# Patient Record
Sex: FEMALE | Race: BLACK OR AFRICAN AMERICAN | NOT HISPANIC OR LATINO | URBAN - METROPOLITAN AREA
[De-identification: names, ages, dates, MRNs, and addresses within clinical notes are randomized per-mention and may not be internally consistent; named-entity substitution may affect disease eponyms.]

---

## 2020-01-05 ENCOUNTER — EMERGENCY (EMERGENCY)
Facility: HOSPITAL | Age: 30
LOS: 0 days | Discharge: HOME | End: 2020-01-05
Attending: EMERGENCY MEDICINE
Payer: SELF-PAY

## 2020-01-05 VITALS
TEMPERATURE: 98 F | DIASTOLIC BLOOD PRESSURE: 61 MMHG | RESPIRATION RATE: 18 BRPM | HEART RATE: 52 BPM | OXYGEN SATURATION: 100 % | SYSTOLIC BLOOD PRESSURE: 108 MMHG

## 2020-01-05 VITALS — WEIGHT: 190.04 LBS | HEIGHT: 63 IN

## 2020-01-05 DIAGNOSIS — O20.9 HEMORRHAGE IN EARLY PREGNANCY, UNSPECIFIED: ICD-10-CM

## 2020-01-05 DIAGNOSIS — O03.4 INCOMPLETE SPONTANEOUS ABORTION WITHOUT COMPLICATION: ICD-10-CM

## 2020-01-05 LAB
ALBUMIN SERPL ELPH-MCNC: 3.3 G/DL — SIGNIFICANT CHANGE UP (ref 3.3–5)
ALP SERPL-CCNC: 58 U/L — SIGNIFICANT CHANGE UP (ref 40–120)
ALT FLD-CCNC: 25 U/L — SIGNIFICANT CHANGE UP (ref 12–78)
ANION GAP SERPL CALC-SCNC: 2 MMOL/L — LOW (ref 5–17)
APPEARANCE UR: ABNORMAL
APTT BLD: 32.9 SEC — SIGNIFICANT CHANGE UP (ref 27.5–36.3)
AST SERPL-CCNC: 13 U/L — LOW (ref 15–37)
BASOPHILS # BLD AUTO: 0.03 K/UL — SIGNIFICANT CHANGE UP (ref 0–0.2)
BASOPHILS NFR BLD AUTO: 0.4 % — SIGNIFICANT CHANGE UP (ref 0–2)
BILIRUB SERPL-MCNC: 0.3 MG/DL — SIGNIFICANT CHANGE UP (ref 0.2–1.2)
BILIRUB UR-MCNC: NEGATIVE — SIGNIFICANT CHANGE UP
BUN SERPL-MCNC: 8 MG/DL — SIGNIFICANT CHANGE UP (ref 7–23)
CALCIUM SERPL-MCNC: 8.6 MG/DL — SIGNIFICANT CHANGE UP (ref 8.5–10.1)
CHLORIDE SERPL-SCNC: 108 MMOL/L — SIGNIFICANT CHANGE UP (ref 96–108)
CO2 SERPL-SCNC: 26 MMOL/L — SIGNIFICANT CHANGE UP (ref 22–31)
COLOR SPEC: ABNORMAL
CREAT SERPL-MCNC: 0.72 MG/DL — SIGNIFICANT CHANGE UP (ref 0.5–1.3)
DIFF PNL FLD: ABNORMAL
EOSINOPHIL # BLD AUTO: 0.09 K/UL — SIGNIFICANT CHANGE UP (ref 0–0.5)
EOSINOPHIL NFR BLD AUTO: 1.2 % — SIGNIFICANT CHANGE UP (ref 0–6)
GLUCOSE SERPL-MCNC: 91 MG/DL — SIGNIFICANT CHANGE UP (ref 70–99)
GLUCOSE UR QL: NEGATIVE MG/DL — SIGNIFICANT CHANGE UP
HCG SERPL-ACNC: 1105 MIU/ML — HIGH
HCT VFR BLD CALC: 32.4 % — LOW (ref 34.5–45)
HCT VFR BLD CALC: 34.7 % — SIGNIFICANT CHANGE UP (ref 34.5–45)
HGB BLD-MCNC: 10.2 G/DL — LOW (ref 11.5–15.5)
HGB BLD-MCNC: 11.1 G/DL — LOW (ref 11.5–15.5)
IMM GRANULOCYTES NFR BLD AUTO: 0.3 % — SIGNIFICANT CHANGE UP (ref 0–1.5)
INR BLD: 1.29 RATIO — HIGH (ref 0.88–1.16)
KETONES UR-MCNC: ABNORMAL
LEUKOCYTE ESTERASE UR-ACNC: ABNORMAL
LYMPHOCYTES # BLD AUTO: 1.81 K/UL — SIGNIFICANT CHANGE UP (ref 1–3.3)
LYMPHOCYTES # BLD AUTO: 23.8 % — SIGNIFICANT CHANGE UP (ref 13–44)
MCHC RBC-ENTMCNC: 29.5 PG — SIGNIFICANT CHANGE UP (ref 27–34)
MCHC RBC-ENTMCNC: 32 GM/DL — SIGNIFICANT CHANGE UP (ref 32–36)
MCV RBC AUTO: 92.3 FL — SIGNIFICANT CHANGE UP (ref 80–100)
MONOCYTES # BLD AUTO: 0.48 K/UL — SIGNIFICANT CHANGE UP (ref 0–0.9)
MONOCYTES NFR BLD AUTO: 6.3 % — SIGNIFICANT CHANGE UP (ref 2–14)
NEUTROPHILS # BLD AUTO: 5.19 K/UL — SIGNIFICANT CHANGE UP (ref 1.8–7.4)
NEUTROPHILS NFR BLD AUTO: 68 % — SIGNIFICANT CHANGE UP (ref 43–77)
NITRITE UR-MCNC: NEGATIVE — SIGNIFICANT CHANGE UP
PH UR: 7 — SIGNIFICANT CHANGE UP (ref 5–8)
PLATELET # BLD AUTO: 256 K/UL — SIGNIFICANT CHANGE UP (ref 150–400)
POTASSIUM SERPL-MCNC: 3.8 MMOL/L — SIGNIFICANT CHANGE UP (ref 3.5–5.3)
POTASSIUM SERPL-SCNC: 3.8 MMOL/L — SIGNIFICANT CHANGE UP (ref 3.5–5.3)
PROT SERPL-MCNC: 7.5 GM/DL — SIGNIFICANT CHANGE UP (ref 6–8.3)
PROT UR-MCNC: 100 MG/DL
PROTHROM AB SERPL-ACNC: 14.4 SEC — HIGH (ref 10–12.9)
RBC # BLD: 3.76 M/UL — LOW (ref 3.8–5.2)
RBC # FLD: 12.6 % — SIGNIFICANT CHANGE UP (ref 10.3–14.5)
SODIUM SERPL-SCNC: 136 MMOL/L — SIGNIFICANT CHANGE UP (ref 135–145)
SP GR SPEC: 1.01 — SIGNIFICANT CHANGE UP (ref 1.01–1.02)
UROBILINOGEN FLD QL: NEGATIVE MG/DL — SIGNIFICANT CHANGE UP
WBC # BLD: 7.62 K/UL — SIGNIFICANT CHANGE UP (ref 3.8–10.5)
WBC # FLD AUTO: 7.62 K/UL — SIGNIFICANT CHANGE UP (ref 3.8–10.5)

## 2020-01-05 PROCEDURE — 84702 CHORIONIC GONADOTROPIN TEST: CPT

## 2020-01-05 PROCEDURE — 99284 EMERGENCY DEPT VISIT MOD MDM: CPT | Mod: 25

## 2020-01-05 PROCEDURE — 85018 HEMOGLOBIN: CPT

## 2020-01-05 PROCEDURE — 85014 HEMATOCRIT: CPT

## 2020-01-05 PROCEDURE — 76856 US EXAM PELVIC COMPLETE: CPT | Mod: 26

## 2020-01-05 PROCEDURE — 86900 BLOOD TYPING SEROLOGIC ABO: CPT

## 2020-01-05 PROCEDURE — 96374 THER/PROPH/DIAG INJ IV PUSH: CPT

## 2020-01-05 PROCEDURE — 80053 COMPREHEN METABOLIC PANEL: CPT

## 2020-01-05 PROCEDURE — 76856 US EXAM PELVIC COMPLETE: CPT

## 2020-01-05 PROCEDURE — 87086 URINE CULTURE/COLONY COUNT: CPT

## 2020-01-05 PROCEDURE — 85730 THROMBOPLASTIN TIME PARTIAL: CPT

## 2020-01-05 PROCEDURE — 96375 TX/PRO/DX INJ NEW DRUG ADDON: CPT

## 2020-01-05 PROCEDURE — 99284 EMERGENCY DEPT VISIT MOD MDM: CPT

## 2020-01-05 PROCEDURE — 96361 HYDRATE IV INFUSION ADD-ON: CPT

## 2020-01-05 PROCEDURE — 85610 PROTHROMBIN TIME: CPT

## 2020-01-05 PROCEDURE — 86850 RBC ANTIBODY SCREEN: CPT

## 2020-01-05 PROCEDURE — 86901 BLOOD TYPING SEROLOGIC RH(D): CPT

## 2020-01-05 PROCEDURE — 81001 URINALYSIS AUTO W/SCOPE: CPT

## 2020-01-05 PROCEDURE — 36415 COLL VENOUS BLD VENIPUNCTURE: CPT

## 2020-01-05 PROCEDURE — 85025 COMPLETE CBC W/AUTO DIFF WBC: CPT

## 2020-01-05 PROCEDURE — 99243 OFF/OP CNSLTJ NEW/EST LOW 30: CPT

## 2020-01-05 RX ORDER — ONDANSETRON 8 MG/1
4 TABLET, FILM COATED ORAL ONCE
Refills: 0 | Status: COMPLETED | OUTPATIENT
Start: 2020-01-05 | End: 2020-01-05

## 2020-01-05 RX ORDER — SODIUM CHLORIDE 9 MG/ML
1000 INJECTION INTRAMUSCULAR; INTRAVENOUS; SUBCUTANEOUS ONCE
Refills: 0 | Status: COMPLETED | OUTPATIENT
Start: 2020-01-05 | End: 2020-01-05

## 2020-01-05 RX ORDER — MORPHINE SULFATE 50 MG/1
4 CAPSULE, EXTENDED RELEASE ORAL ONCE
Refills: 0 | Status: DISCONTINUED | OUTPATIENT
Start: 2020-01-05 | End: 2020-01-05

## 2020-01-05 RX ADMIN — SODIUM CHLORIDE 1000 MILLILITER(S): 9 INJECTION INTRAMUSCULAR; INTRAVENOUS; SUBCUTANEOUS at 11:46

## 2020-01-05 RX ADMIN — MORPHINE SULFATE 4 MILLIGRAM(S): 50 CAPSULE, EXTENDED RELEASE ORAL at 10:39

## 2020-01-05 RX ADMIN — MORPHINE SULFATE 4 MILLIGRAM(S): 50 CAPSULE, EXTENDED RELEASE ORAL at 11:10

## 2020-01-05 RX ADMIN — SODIUM CHLORIDE 2000 MILLILITER(S): 9 INJECTION INTRAMUSCULAR; INTRAVENOUS; SUBCUTANEOUS at 10:39

## 2020-01-05 RX ADMIN — ONDANSETRON 4 MILLIGRAM(S): 8 TABLET, FILM COATED ORAL at 10:39

## 2020-01-05 NOTE — ED ADULT NURSE NOTE - NSIMPLEMENTINTERV_GEN_ALL_ED
Implemented All Universal Safety Interventions:  [place] to call system. Call bell, personal items and telephone within reach. Instruct patient to call for assistance. Room bathroom lighting operational. Non-slip footwear when patient is off stretcher. Physically safe environment: no spills, clutter or unnecessary equipment. Stretcher in lowest position, wheels locked, appropriate side rails in place.

## 2020-01-05 NOTE — ED PROVIDER NOTE - CARE PROVIDER_API CALL
Jennifer Langston)  Obstetrics and Gynecology  [address] [place] [address]  Phone: [phone]  Fax: [phone]  Follow Up Time:

## 2020-01-05 NOTE — CONSULT NOTE ADULT - SUBJECTIVE AND OBJECTIVE BOX
28 y/o  LMP 10/26/19 EGA 10+ weeks with c/o of vaginal bleeding x 24 hours; +clots; +cramps.  Pt wtih +UCG at home but has not seen her PMD (Y[facility]a) yet for prenatal care.    PMH - not sig  PSH - not sig  POB - C/S x 2  Meds - none    PE -   T(F): 98.6 (05 Jan 2020 09:28), Max: 98.6 (05 Jan 2020 09:28)  HR: 108 (05 Jan 2020 09:28) (108 - 108)  BP: 127/81 (05 Jan 2020 09:28) (127/81 - 127/81)    PE -   Ab - soft/nt/nd  Pelvic - Os open; minimal bleeding noted; about 10 cc of dark red blood clots in vagina.  Attempt made to remove any POCs from cervix but unsuccessful  Ext - No C/C/E, no calf pain    Sono:    EXAM:  US PELVIC COMPLETE                            PROCEDURE DATE:  01/05/2020          INTERPRETATION:  CLINICAL INFORMATION: 10 weeks pregnancy with heavy vaginal bleeding.    LMP: 10/26/2019    COMPARISON: None available.    TECHNIQUE:     Endovaginal and transabdominal pelvic sonogram. Color and Spectral Doppler was performed. Endovaginal scanning was performed for detailed visualization of the endometrium and the ovaries.    FINDINGS:    Uterus: 10.1 x 4.7 x 4.8 cm. No focal myometrial lesion.    Endometrium: 18 mm. No evidence of an intrauterine gestational sac. There is however evidence of a complex structure in the region of cervix. In view of clinical history this may represent gestational products and hence a miscarriage in progress. A follow-up can be obtained if needed.    Right ovary: 2.6 x 2.1 x 1.8 cm. Within normal limits. Normal arterial and venous waveforms.    Left ovary: 2.8 x 1.6 x 1.6 cm. Within normal limits. Normal arterial and venous waveforms.    Fluid: None.    IMPRESSION:    Mildly prominent endometrium without an intrauterine gestational sac or any evidence of flow within the endometrial cavity.    Complex cystic structure in the region of cervix. Consider possibility of a miscarriage in progress. A follow-up can be obtained.    URIEL BORREGO   This document has been electronically signed. Jan 5 2020 11:32AM                          11.1   7.62  )-----------( 256      ( 05 Jan 2020 10:07 )             34.7     RH B+    A/P Pt with most likely SAB in progress.  Pt in stable condition.    Discussed R/B/A of exp mangement vs, Misoprostol vs D and C    Pt wishes to have Misoprostol.    Rec:  1) 600 mcg of Misoprostol Buccal - hold in buccal area x 20 min  2) Repeat vitals  3) Consider CBC to assess stability of HCT  4) Pt to F/U with PMD  5) Come back if any increased pain, bleeding, temps, N/V, LOC    N Gabbur

## 2020-01-05 NOTE — ED PROVIDER NOTE - OBJECTIVE STATEMENT
30 y/o P:2 10 week pregnant female with no PMHx, PSHx of s/p  x2 presents to the ED c/o vaginal bleeding since this AM. Since this AM pt has had vaginal bleeding, is passing fresh clots. Describes bleeding as "a lot," heavier than normal menses. Associated with abd cramps. Took Tylenol for symptom relief PTA. Has not had evaluation with OB/GYN for current pregnancy. No recent trauma, falls, injury. Former smoker. Occasional EtOH. OB/GYN: James ([place])

## 2020-01-05 NOTE — ED ADULT NURSE NOTE - OBJECTIVE STATEMENT
Pt ambulatory to triage, A&O x3, c/o vaginal bleeding since yesterday.  Pt reports spotting started yesterday around 1pm, bleeding has gradually increased since last night, pt reports bleeding became heavy this morning saturating about one pad every 2 hours.  Pt reports being 10 weeks pregnant, took home pregnancy test, has not seen OB/GYN yet.  Pt has 4 living children, no hx of previous miscarriage.  Pt c/o lower abdominal pain and cramping, rating 10/10, and mild nausea.  No PMH.

## 2020-01-05 NOTE — ED PROVIDER NOTE - PATIENT PORTAL LINK FT
You can access the FollowMyHealth Patient Portal offered by [facility] by registering at the following website: [email or web address]. By joining [employer]’s FollowMyHealth portal, you will also be able to view your health information using other applications (apps) compatible with our system.

## 2020-01-05 NOTE — ED ADULT TRIAGE NOTE - CHIEF COMPLAINT QUOTE
Pt reports being ten weeks pregnant and experiencing significant vaginal bleeding with abd cramping. .

## 2020-01-05 NOTE — ED PROVIDER NOTE - NSFOLLOWUPINSTRUCTIONS_ED_ALL_ED_FT
Please follow up with your own gynecologist, if you do not have one then return to us or contact the number provided for you here.     Incomplete Miscarriage  A miscarriage is the loss of an unborn baby (fetus) before the 20th week of pregnancy. In an incomplete miscarriage, parts of the fetus or placenta (afterbirth) remain in the body. Most miscarriages happen in the first 3 months of pregnancy. Sometimes, it happens before a woman even knows she is pregnant.  Having a miscarriage can be an emotional experience. If you have had a miscarriage, talk with your health care provider about any questions you may have about miscarrying, the grieving process, and your future pregnancy plans.  What are the causes?  This condition may be caused by:  Problems with the genes or chromosomes that make it impossible for the baby to develop normally. These problems are most often the result of random errors that occur early in development, and are not passed from parent to child (not inherited).Infection of the cervix or uterus.Conditions that affect hormone balance in the body.Problems with the cervix, such as the cervix opening and thinning before pregnancy is at term (cervical insufficiency).Problems with the uterus, such as a uterus with an abnormal shape, fibroids in the uterus, or problems that were present from birth (congenital abnormalities).Certain medical conditions.Smoking, drinking alcohol, or using drugs.Injury (trauma).Many times, the cause of a miscarriage is not known.  What are the signs or symptoms?  Symptoms of this condition include:  Vaginal bleeding or spotting, with or without cramps or pain.Pain or cramping in the abdomen or lower back.Passing fluid, tissue, or blood clots from the vagina.How is this diagnosed?  This condition may be diagnosed based on:  A physical exam.Ultrasound.Blood tests.Urine tests.How is this treated?  An incomplete miscarriage may be treated with:  Dilation and curettage (D&C). This is a procedure in which the cervix is stretched open and the lining of the uterus (endometrium) is scraped to remove any remaining tissue from the pregnancy.Medicines, such as:  Antibiotic medicine to treat infection.Medicine to help any remaining tissue pass out of your uterus.Medicine to reduce (contract) the size of the uterus. These medicines may be given if you have a lot of bleeding.If you have Rh negative blood and your baby was Rh positive, you will need a shot of medicine called Rh immunoglobulinto protect future babies from Rh blood problems. "Rh-negative" and "Rh-positive" refer to whether or not the blood has a specific protein found on the surface of red blood cells (Rh factor).  Follow these instructions at home:  Medicines        Take over-the-counter and prescription medicines only as told by your health care provider.If you were prescribed antibiotic medicine, take your antibiotic as told by your health care provider. Do not stop taking the antibiotic even if you start to feel better.Do not take NSAIDs, such as aspirin and ibuprofen, unless approved by your doctor. These medicines can cause bleeding.Activity     Rest as directed. Ask your health care provider what activities are safe for you.Have someone help with home and family responsibilities during this time.General instructions     Keep track of the number of sanitary pads you use each day and how soaked (saturated) they are. Write down this information.Monitor the amount of tissue or blood clots that you pass from your vagina. Save any large amounts of tissue for your health care provider to examine.Do not use tampons, douche, or have sex until your health care provider approves.To help you and your partner with the process of grieving, talk with your health care provider or seek counseling to help cope with the pregnancy loss.When you are ready, meet with your health care provider to discuss important steps you should take for your health, as well as steps to take in order to have a healthy pregnancy in the future.Keep all follow-up visits as told by your health care provider. This is important.Where to find more information  The American Congress of Obstetricians and Gynecologists: www.acog.orgU.S. Department of Health and Human Services Office of Women’s Health: www.womenshealth.govContact a health care provider if:  You have a fever or chills.You have a foul smelling vaginal discharge.Get help right away if:  You have severe cramps or pain in your back or abdomen.You pass walnut-sized (or larger) blood clots or tissue from your vagina.You have heavy bleeding, soaking more than 1 regular sanitary pad in an hour.You become lightheaded or weak.You pass out.You have feelings of sadness that take over your thoughts, or you have thoughts of hurting yourself.Summary  In an incomplete miscarriage, parts of the fetus or placenta (afterbirth) remain in the body.There are multiple treatment options for an incomplete miscarriage, talk to your health care provider about the best option for you.Follow your health care provider's instructions for follow-up care.To help you and your partner with the process of grieving, talk with your health care provider or seek counseling to help cope with the pregnancy loss.This information is not intended to replace advice given to you by your health care provider. Make sure you discuss any questions you have with your health care provider.

## 2020-01-05 NOTE — ED PROVIDER NOTE - PROGRESS NOTE DETAILS
Brannon NEIL for ED attending Dr. Amaya: Updated pt with results of labs, awaiting results of sonogram. Brannon NEIL for ED attending Dr. Amaya: Pt is B+, does not require RhoGam, however, HBG low for gestational age. Will consult GYN as pt is anemic and still having active vaginal bleeding. Brannon NEIL for ED attending Dr. Amaya: US shows possible miscarriage in process with some cystic structure in the region of the cervix. Spoke with OB/GYN who will come and see pt.

## 2020-01-06 DIAGNOSIS — Z98.891 HISTORY OF UTERINE SCAR FROM PREVIOUS SURGERY: Chronic | ICD-10-CM

## 2020-01-06 LAB
CULTURE RESULTS: SIGNIFICANT CHANGE UP
SPECIMEN SOURCE: SIGNIFICANT CHANGE UP